# Patient Record
Sex: FEMALE
[De-identification: names, ages, dates, MRNs, and addresses within clinical notes are randomized per-mention and may not be internally consistent; named-entity substitution may affect disease eponyms.]

---

## 2018-08-21 ENCOUNTER — HOSPITAL ENCOUNTER (EMERGENCY)
Dept: HOSPITAL 5 - ED | Age: 73
Discharge: HOME | End: 2018-08-21
Payer: MEDICARE

## 2018-08-21 VITALS — SYSTOLIC BLOOD PRESSURE: 208 MMHG | DIASTOLIC BLOOD PRESSURE: 78 MMHG

## 2018-08-21 DIAGNOSIS — R33.9: ICD-10-CM

## 2018-08-21 DIAGNOSIS — I12.9: Primary | ICD-10-CM

## 2018-08-21 DIAGNOSIS — Z88.8: ICD-10-CM

## 2018-08-21 DIAGNOSIS — Z79.82: ICD-10-CM

## 2018-08-21 DIAGNOSIS — N18.9: ICD-10-CM

## 2018-08-21 LAB
BUN SERPL-MCNC: 28 MG/DL (ref 7–17)
BUN/CREAT SERPL: 12 %
CALCIUM SERPL-MCNC: 9.3 MG/DL (ref 8.4–10.2)
HCT VFR BLD CALC: 30.6 % (ref 30.3–42.9)
HEMOLYSIS INDEX: 4
HGB BLD-MCNC: 10 GM/DL (ref 10.1–14.3)
MCH RBC QN AUTO: 27 PG (ref 28–32)
MCHC RBC AUTO-ENTMCNC: 33 % (ref 30–34)
MCV RBC AUTO: 82 FL (ref 79–97)
PH UR STRIP: 7 [PH] (ref 5–7)
PLATELET # BLD: 274 K/MM3 (ref 140–440)
RBC # BLD AUTO: 3.72 M/MM3 (ref 3.65–5.03)
RBC #/AREA URNS HPF: < 1 /HPF (ref 0–6)
UROBILINOGEN UR-MCNC: < 2 MG/DL (ref ?–2)
WBC #/AREA URNS HPF: < 1 /HPF (ref 0–6)

## 2018-08-21 PROCEDURE — 36415 COLL VENOUS BLD VENIPUNCTURE: CPT

## 2018-08-21 PROCEDURE — 83735 ASSAY OF MAGNESIUM: CPT

## 2018-08-21 PROCEDURE — 87086 URINE CULTURE/COLONY COUNT: CPT

## 2018-08-21 PROCEDURE — 81001 URINALYSIS AUTO W/SCOPE: CPT

## 2018-08-21 PROCEDURE — 99283 EMERGENCY DEPT VISIT LOW MDM: CPT

## 2018-08-21 PROCEDURE — 80048 BASIC METABOLIC PNL TOTAL CA: CPT

## 2018-08-21 PROCEDURE — 85027 COMPLETE CBC AUTOMATED: CPT

## 2018-08-21 PROCEDURE — 51702 INSERT TEMP BLADDER CATH: CPT

## 2018-08-21 NOTE — EMERGENCY DEPARTMENT REPORT
ED General Adult HPI





- General


Chief complaint: Medical Clearance


Stated complaint: POSS RENAL FAILURE


Time Seen by Provider: 08/21/18 07:10


Source: patient, EMS (ems notes not available at time of  chart dictation)


Mode of arrival: Stretcher


Limitations: No Limitations





- History of Present Illness


Initial comments: 





Primary care Dr.: Dr. Andrea Arguelles


Urology: Dr. Bahena


This is a 73-year-old female with a past medical history of hypertension and 

renal insufficiency.  She does not think that she has a private nephrologist at 

this time.  She presents to the ER with a complaint of inability to urinate.  

This is constant and started within the past 12 hours.  It does not radiate 

anywhere, and has no exacerbating or relieving factors.  She thinks that she 

might have a urinary tract infection but is not sure.  She denies headache, 

neck pain, chest pain, abdominal pain, shortness of breath, extremity weakness.








-: Gradual


Consistency: constant


Improves with: none


Worsens with: none


Associated Symptoms: denies other symptoms





- Related Data


 Home Medications











 Medication  Instructions  Recorded  Confirmed  Last Taken


 


Aspirin [Aspirin BABY CHEW TAB] 81 mg PO DAILY 08/21/18 08/21/18 Unknown


 


Beclomethasone Dipropionate [Qvar] 1 puff INHALATION PRN PRN 08/21/18 08/21/18 

Unknown


 


Biotin 5,000 mcg SL DAILY 08/21/18 08/21/18 Unknown


 


Calcium Acetate [Phoslo] 667 mg PO TID 08/21/18 08/21/18 Unknown


 


Clopidogrel Bisulfate [Clopidogrel] 75 mg PO DAILY 08/21/18 08/21/18 Unknown


 


Furosemide [Lasix TAB] 40 mg PO QDAY 08/21/18 08/21/18 Unknown


 


Levothyroxine [Synthroid] 75 mcg PO QAM 08/21/18 08/21/18 Unknown


 


Lisinopril [Zestril] 40 mg PO DAILY 08/21/18 08/21/18 Unknown


 


Lovastatin [Altoprev] 40 mg PO HS 08/21/18 08/21/18 Unknown


 


Metoprolol Tartrate 50 mg PO BID 08/21/18 08/21/18 Unknown


 


Omega-3S/Dha/Epa/Fish Oil [Fish 1,200 mg PO DAILY 08/21/18 08/21/18 Unknown





Oil 1,200 mg Softgel]    











 Allergies











Allergy/AdvReac Type Severity Reaction Status Date / Time


 


meperidine [From Demerol] AdvReac  Swelling Verified 08/21/18 07:21














ED Review of Systems


ROS: 


Stated complaint: POSS RENAL FAILURE


Other details as noted in HPI





Comment: All other systems reviewed and negative


Genitourinary: other (retention)





ED Past Medical Hx





- Medications


Home Medications: 


 Home Medications











 Medication  Instructions  Recorded  Confirmed  Last Taken  Type


 


Aspirin [Aspirin BABY CHEW TAB] 81 mg PO DAILY 08/21/18 08/21/18 Unknown History


 


Beclomethasone Dipropionate [Qvar] 1 puff INHALATION PRN PRN 08/21/18 08/21/18 

Unknown History


 


Biotin 5,000 mcg SL DAILY 08/21/18 08/21/18 Unknown History


 


Calcium Acetate [Phoslo] 667 mg PO TID 08/21/18 08/21/18 Unknown History


 


Clopidogrel Bisulfate [Clopidogrel] 75 mg PO DAILY 08/21/18 08/21/18 Unknown 

History


 


Furosemide [Lasix TAB] 40 mg PO QDAY 08/21/18 08/21/18 Unknown History


 


Levothyroxine [Synthroid] 75 mcg PO QAM 08/21/18 08/21/18 Unknown History


 


Lisinopril [Zestril] 40 mg PO DAILY 08/21/18 08/21/18 Unknown History


 


Lovastatin [Altoprev] 40 mg PO HS 08/21/18 08/21/18 Unknown History


 


Metoprolol Tartrate 50 mg PO BID 08/21/18 08/21/18 Unknown History


 


Omega-3S/Dha/Epa/Fish Oil [Fish 1,200 mg PO DAILY 08/21/18 08/21/18 Unknown 

History





Oil 1,200 mg Softgel]     














ED Physical Exam





- General


General appearance: alert, in no apparent distress





- Head


Head exam: Present: atraumatic, normocephalic





- Eye


Eye exam: Present: normal appearance, EOMI.  Absent: nystagmus





- ENT


ENT exam: Present: normal exam, normal orophraynx, mucous membranes moist, 

normal external ear exam





- Neck


Neck exam: Present: normal inspection, full ROM.  Absent: tenderness, 

meningismus





- Respiratory


Respiratory exam: Present: normal lung sounds bilaterally.  Absent: respiratory 

distress





- Cardiovascular


Cardiovascular Exam: Present: regular rate, normal rhythm, normal heart sounds.

  Absent: bradycardia, tachycardia, irregular rhythm, systolic murmur, 

diastolic murmur, rubs, gallop





- GI/Abdominal


GI/Abdominal exam: Present: soft.  Absent: distended, tenderness, guarding, 

rebound, rigid, pulsatile mass





- Extremities Exam


Extremities exam: Present: normal inspection, full ROM, normal capillary refill

, other (2+ pulses noted in the bilateral upper, lower extremities.  

Compartments soft.  No long bony tenderness.  The pelvis is stable.).  Absent: 

tenderness, pedal edema, joint swelling, calf tenderness





- Back Exam


Back exam: Present: normal inspection, full ROM.  Absent: tenderness, CVA 

tenderness (R), paraspinal tenderness, vertebral tenderness





- Neurological Exam


Neurological exam: Present: alert, oriented X3, CN II-XII intact, other (

Extraocular movements intact.  Tongue midline.  No facial droop.  Facial 

sensation intact to light touch in the V1, V2, V3 distribution bilaterally.  5 

and 5 strength in 4 extremities..  Sensation is intact to light touch in 4 

extremities.).  Absent: motor sensory deficit





- Psychiatric


Psychiatric exam: Present: normal affect, normal mood





- Skin


Skin exam: Present: warm, dry, intact, normal color.  Absent: rash





ED Course


 Vital Signs











  08/21/18 08/21/18 08/21/18





  07:08 07:15 07:16


 


Temperature   98.2 F


 


Pulse Rate  86 85


 


Respiratory  18 18





Rate   


 


Blood Pressure  193/87 


 


Blood Pressure   176/86





[Right]   


 


O2 Sat by Pulse 94 95 95





Oximetry   














  08/21/18 08/21/18 08/21/18





  07:20 07:30 07:45


 


Temperature 98.7 F  


 


Pulse Rate 88 86 82


 


Respiratory 20 17 16





Rate   


 


Blood Pressure 176/86 202/84 194/71


 


Blood Pressure   





[Right]   


 


O2 Sat by Pulse 99 95 97





Oximetry   














  08/21/18 08/21/18 08/21/18





  08:00 08:15 08:30


 


Temperature   


 


Pulse Rate 82 81 81


 


Respiratory 18 19 18





Rate   


 


Blood Pressure 195/79 203/76 192/92


 


Blood Pressure   





[Right]   


 


O2 Sat by Pulse 95 98 99





Oximetry   














  08/21/18 08/21/18 08/21/18





  08:45 09:00 09:15


 


Temperature   


 


Pulse Rate 73 74 77


 


Respiratory 17 16 19





Rate   


 


Blood Pressure 203/76 202/79 202/79


 


Blood Pressure   





[Right]   


 


O2 Sat by Pulse 98 97 





Oximetry   














  08/21/18 08/21/18 08/21/18





  09:30 09:45 10:00


 


Temperature   


 


Pulse Rate 80 80 69


 


Respiratory 20 13 19





Rate   


 


Blood Pressure 179/76 178/78 185/69


 


Blood Pressure   





[Right]   


 


O2 Sat by Pulse 95 98 95





Oximetry   














  08/21/18 08/21/18 08/21/18





  10:15 10:30 10:35


 


Temperature   


 


Pulse Rate 83 85 81


 


Respiratory 16 20 





Rate   


 


Blood Pressure 181/84 196/81 196/85


 


Blood Pressure   





[Right]   


 


O2 Sat by Pulse 95 96 





Oximetry   














  08/21/18





  10:36


 


Temperature 


 


Pulse Rate 81


 


Respiratory 





Rate 


 


Blood Pressure 196/85


 


Blood Pressure 





[Right] 


 


O2 Sat by Pulse 





Oximetry 














- Reevaluation(s)


Reevaluation #1: 





08/21/18 07:54


Differential diagnosis, including but not limited to: Chronic renal 

insufficiency, obstructive urinary tract infection, obstructive uropathy, 

incidental elevated blood pressure, electrolyte derangement











Assessment and plan: 73-year-old female with a primary complaint of urinary 

retention.  She is afebrile with reassuring vital signs with the exception of 

elevated blood pressure.  Please reference the [employer] 

[employer] clinical policy on blood pressure/hypertension that is not acutely 

symptomatic.








A Carver catheter will be placed  to a leg bag.  We'll check basic laboratory 

studies, urinalysis and a urine culture.


Reevaluation #2: 





08/21/18 10:45


Laboratory studies demonstrated hyponatremia and mild hypokalemia.  Patient put 

out 300 mL of urine, clear, did not appear to be consistent with urinary tract 

infection.  Blood pressure is somewhat improved.  I have contacted the patient'

s primary nephrologist, Dr. IESHA Lugo.








He agrees with plan for 1 L of IV fluid and 60 g of Kayexalate.  He indicates 

he will have the office contact the patient on her phone number to arrange 

follow-up on Friday for recheck.  The patient will be discharged with a Carver 

catheter placed on a leg bag, and she will also be instructed to follow-up with 

her outpatient urology specialist, Dr. Bahena.





ED Medical Decision Making





- Lab Data


Result diagrams: 


 08/21/18 07:44





 08/21/18 07:44








 Vital Signs











  08/21/18 08/21/18 08/21/18





  07:08 07:15 07:16


 


Temperature   98.2 F


 


Pulse Rate  86 85


 


Respiratory  18 18





Rate   


 


Blood Pressure  193/87 


 


Blood Pressure   176/86





[Right]   


 


O2 Sat by Pulse 94 95 95





Oximetry   














  08/21/18





  07:20


 


Temperature 98.7 F


 


Pulse Rate 88


 


Respiratory 20





Rate 


 


Blood Pressure 176/86


 


Blood Pressure 





[Right] 


 


O2 Sat by Pulse 99





Oximetry 











Critical care attestation.: 


If time is entered above; I have spent that time in minutes in the direct care 

of this critically ill patient, excluding procedure time.








ED Disposition


Clinical Impression: 


 CKD (chronic kidney disease), Urinary retention





Disposition: DC-01 TO HOME OR SELFCARE


Is pt being admited?: No


Does the pt Need Aspirin: No


Condition: Stable


Instructions:  Chronic Kidney Disease (ED), Acute Urinary Retention in Women (ED

)


Additional Instructions: 


Continue current outpatient medications.  Please note that blood pressure was 

elevated.  This should be followed up by a primary care doctor or nephrology 

specialist within the next month.  Long-term complications of hypertension and 

elevated blood pressure includes stroke, heart attack, disability, paralysis, 

loss of quality of life.


Please note that your primary nephrologist, Dr. Lugo would like you to 

follow up by this Friday for a recheck.


He indicated his office would contact the patient, but I also recommend that 

the patient contact the office today to confirm and arrange this follow-up for 

Friday.  The front office staff know that the emergency medicine physician has 

spoken to her private nephrologist, and he would like to see you on Friday.








Keep the Carver catheter in place attached to the leg bag, and follow-up with 

urology specialist, Dr. Bahena, within the next 5-7 days for trial of void in 

the office.  Return to the ER right away with new pain, worsened pain, 

migration of pain, projectile vomiting, change in mental status, confusion, 

inability to tolerate liquid feeds.








Referrals: 


CARLOS EDUARDO JENNINGS MD [Primary Care Provider] - 3-5 Days


LEONEL WALSH MD [Staff Physician] - 3-5 Days


NAIN BAHENA MD [Referring] - 3-5 Days